# Patient Record
Sex: MALE | Race: WHITE | NOT HISPANIC OR LATINO | Employment: UNEMPLOYED | URBAN - METROPOLITAN AREA
[De-identification: names, ages, dates, MRNs, and addresses within clinical notes are randomized per-mention and may not be internally consistent; named-entity substitution may affect disease eponyms.]

---

## 2024-07-16 ENCOUNTER — HOSPITAL ENCOUNTER (EMERGENCY)
Facility: HOSPITAL | Age: 59
Discharge: HOME | End: 2024-07-16
Attending: EMERGENCY MEDICINE
Payer: COMMERCIAL

## 2024-07-16 VITALS
WEIGHT: 242.95 LBS | HEART RATE: 72 BPM | OXYGEN SATURATION: 100 % | SYSTOLIC BLOOD PRESSURE: 177 MMHG | TEMPERATURE: 97.8 F | RESPIRATION RATE: 18 BRPM | BODY MASS INDEX: 32.06 KG/M2 | DIASTOLIC BLOOD PRESSURE: 85 MMHG

## 2024-07-16 DIAGNOSIS — I10 HYPERTENSION, UNSPECIFIED TYPE: ICD-10-CM

## 2024-07-16 DIAGNOSIS — S61.511A WRIST LACERATION, RIGHT, INITIAL ENCOUNTER: Primary | ICD-10-CM

## 2024-07-16 PROCEDURE — 90715 TDAP VACCINE 7 YRS/> IM: CPT | Performed by: EMERGENCY MEDICINE

## 2024-07-16 PROCEDURE — 2500000005 HC RX 250 GENERAL PHARMACY W/O HCPCS: Performed by: CLINICAL NURSE SPECIALIST

## 2024-07-16 PROCEDURE — 2500000004 HC RX 250 GENERAL PHARMACY W/ HCPCS (ALT 636 FOR OP/ED): Performed by: EMERGENCY MEDICINE

## 2024-07-16 PROCEDURE — 12001 RPR S/N/AX/GEN/TRNK 2.5CM/<: CPT | Performed by: CLINICAL NURSE SPECIALIST

## 2024-07-16 PROCEDURE — 90471 IMMUNIZATION ADMIN: CPT | Performed by: EMERGENCY MEDICINE

## 2024-07-16 PROCEDURE — 99283 EMERGENCY DEPT VISIT LOW MDM: CPT | Mod: 25

## 2024-07-16 RX ORDER — ACETAMINOPHEN 325 MG/1
650 TABLET ORAL ONCE
Status: COMPLETED | OUTPATIENT
Start: 2024-07-16 | End: 2024-07-16

## 2024-07-16 RX ORDER — LIDOCAINE HYDROCHLORIDE 10 MG/ML
5 INJECTION INFILTRATION; PERINEURAL ONCE
Status: COMPLETED | OUTPATIENT
Start: 2024-07-16 | End: 2024-07-16

## 2024-07-16 ASSESSMENT — PAIN - FUNCTIONAL ASSESSMENT: PAIN_FUNCTIONAL_ASSESSMENT: 0-10

## 2024-07-16 ASSESSMENT — COLUMBIA-SUICIDE SEVERITY RATING SCALE - C-SSRS
6. HAVE YOU EVER DONE ANYTHING, STARTED TO DO ANYTHING, OR PREPARED TO DO ANYTHING TO END YOUR LIFE?: NO
1. IN THE PAST MONTH, HAVE YOU WISHED YOU WERE DEAD OR WISHED YOU COULD GO TO SLEEP AND NOT WAKE UP?: NO
2. HAVE YOU ACTUALLY HAD ANY THOUGHTS OF KILLING YOURSELF?: NO

## 2024-07-16 ASSESSMENT — PAIN DESCRIPTION - LOCATION: LOCATION: HAND

## 2024-07-16 ASSESSMENT — PAIN SCALES - GENERAL: PAINLEVEL_OUTOF10: 5 - MODERATE PAIN

## 2024-07-16 ASSESSMENT — PAIN DESCRIPTION - PAIN TYPE: TYPE: ACUTE PAIN

## 2024-07-16 NOTE — PROGRESS NOTES
Attestation/Supervisory note for MAG Martinez      The patient is a 58-year-old male presenting to the emergency department for evaluation of a laceration to the right wrist.  The patient states that he was at work just prior to arrival.  He states he turned around and accidentally stabbed himself with a tool as he did so.  He states that it was bleeding and he was concerned that he may need stitches so he came to the emergency room.  Unknown date of last tetanus.  He is ambidextrous.  He denies any headache or visual changes.  No chest pain or shortness of breath.  No abdominal pain.  No nausea vomiting.  No diarrhea or constipation.  No urinary complaints.  No weakness or numbness.  No fever or chills.  No use of blood thinners.  All pertinent positives and negatives are recorded above.  All other systems reviewed and otherwise negative.  Vital signs with hypertension but otherwise within normal limits.  Physical exam with a well-nourished well-developed male in no acute distress.  HEENT exam within normal limits.  He has no evidence of airway compromise or respiratory distress.  Abdominal exam is benign.  He has no gross motor, neurologic or vascular deficits on exam.  Pulses are equal bilaterally.  He does not have any visible or palpable bony deformities.  No active bleeding at this time.      Wound care provided by nursing staff.  Tetanus was updated.      The laceration was repaired by MAG Martinez without complication      No indication for emergent imaging and/or diagnostic testing at this time      The patient was released in good condition.  He was instructed to follow-up with his primary care physician within 1 to 2 days for repeat check of his blood pressure.  He was also instructed to follow-up with the Worker's Compensation provider within 1 to 2 days for wound check and within 10 to 14 days for suture removal.  He will return to the emergency department sooner with worsening of symptoms or onset of new  symptoms      Impression/diagnosis:  1.  Right wrist laceration  2.  Hypertension, unspecified      I personally saw the patient and made/approve the management plan and take responsibility for the patient management.      I personally discussed the patient's management with the patient        Helen Louie MD

## 2024-07-16 NOTE — DISCHARGE INSTRUCTIONS
Follow-up with your primary care physician within 1 to 2 days for repeat check of your blood pressure.      Follow-up with the [place] of Worker's Compensation provider within 1 to 2 days for a wound check and within 10 to 14 days for suture removal.      Return to the emergency department sooner with worsening of symptoms or onset of new symptoms

## 2024-07-16 NOTE — Clinical Note
Wilfredo Graves was seen and treated in our emergency department on 7/16/2024.  He may return to work on 07/16/2024.  Limited use of the right hand/wrist     If you have any questions or concerns, please don't hesitate to call.      Helen Louie MD

## 2024-07-16 NOTE — ED PROVIDER NOTES
Department of Emergency Medicine   ED  Provider Note  Admit Date/RoomTime: 7/16/2024 10:33 AM  ED Room: ST26/ST26        History of Present Illness:  Chief Complaint   Patient presents with    Laceration     Cut R hand at work on a tool. Last tetanus > 5 yrs ago.          Wilfredo Graves is a 58 y.o. male presents to the emergency department complaints of laceration to his right wrist onset of symptoms about 1010 this morning.  Patient states he went to turn his hand while at work and excellently pulled it into a tool that was sticking out that he was working.  Instantly had pain with bleeding.  Reports he had brisk bleeding was able to compress the bleeding with pressure.  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  Reports chronic tingling sensation in his fingertips with no change.  Denies weakness.  No change in temperature color.  States otherwise in his normal state of health.  Unsure when his last tetanus shot was given.  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    Review of Systems:   Pertinent positives and negatives are stated within HPI, all other systems reviewed and are negative.        --------------------------------------------- PAST HISTORY ---------------------------------------------  Past Medical History:  has a past medical history of Personal history of other diseases of the musculoskeletal system and connective tissue.  Past Surgical History:  has no past surgical history on file.  Social History:    Family History: family history is not on file.. Unless otherwise noted, family history is non contributory  The patient’s home medications have been reviewed.  Allergies: Patient has no known allergies.        ---------------------------------------------------PHYSICAL EXAM--------------------------------------    GENERAL APPEARANCE: Awake and alert.   VITAL SIGNS: As per the  nurses' triage record.  Elevated blood pressure  HEENT: Normocephalic, atraumatic. Extraocular muscles are intact. CHEST: Nontender to palpation. Clear to auscultation bilaterally. No rales, rhonchi, or wheezing.   HEART: S1, S2. Regular rate and rhythm. No murmurs, gallops or rubs.  Strong and equal pulses in the extremities.   ABDOMEN: Soft, nontender, nondistended, positive bowel sounds, no palpable masses.  MUSCULCSKELETAL: Right upper extremity handgrip is strong hand spread is strong.  Cap refill less than 2 sensation intact.  Wiggles fingers no difficulty.  Gaping laceration to the palmar aspect distal base of the palm 1.2 cm gaping will require suture repair.  Appears to be no tendon involvement no visible foreign body.  Bleeding controlled.  Baseline visualized in a bloodless field.  Radial pulse strong and intact cap refill less than 2 sensation intact.  No redness or warmth no drainage.  Moving all extremities per normal per patient.  NEUROLOGICAL: Awake, alert and oriented x 3. Power intact in the upper Sensation is intact to light touch in the upper   IMMUNOLOGICAL: No lymphatic streaking noted   DERM: No petechiae, rashes, or ecchymoses.          ------------------------- NURSING NOTES AND VITALS REVIEWED ---------------------------  The nursing notes within the ED encounter and vital signs as below have been reviewed by myself  /85   Pulse 72   Temp 36.6 °C (97.8 °F) (Oral)   Resp 18   Wt 110 kg (242 lb 15.2 oz)   SpO2 100%   BMI 32.06 kg/m²     Oxygen Saturation Interpretation: 100% room air        The patient’s available past medical records and past encounters were reviewed.          -----------------------DIAGNOSTIC RESULTS------------------------  LABS:    Labs Reviewed - No data to display    As interpreted by me, the above displayed labs are abnormal. All other labs obtained during this visit were within normal range or not returned as of this  dictation.        ------------------------------ ED COURSE/MEDICAL DECISION MAKING----------------------  Medical Decision Making:   Exam: A medically appropriate exam performed, outlined above, given the known history and presentation.    History obtained from: Review of medical record nursing notes patient      Social Determinants of Health considered during this visit: Injury occurred at work   PAST MEDICAL HISTORY/Chronic Conditions Affecting Care     has a past medical history of Personal history of other diseases of the musculoskeletal system and connective tissue.       CC/HPI Summary, Social Determinants of health, Records Reviewed, DDx, testing done/not done, ED Course, Reassessment, disposition considerations/shared decision making with patient, consults, disposition:   Presents emergency department for laceration to his left wrist  Tetanus  Wound care  Laceration repair  Tylenol  Medical Decision Making/Differential Diagnosis:  Based on patient clinical presentation history and symptoms consistent with laceration to the right hand requiring suture repair.  No obvious tendon involvement.  No foreign body base of wound visualized in a bloodless field.  No weakness noted.  No bony abnormality noted.  Please see suture repair note.  Patient remains neurovascularly intact before and after procedure.  Handgrip is strong hand spread is strong.  Cap refill less than 2 sensation 12 radial pulse strong and intact.  Is a patient clinical limitation history and symptoms consistent with laceration to the right hand wrist area.  Requiring 3 simple interrupted sutures neurovascularly intact.  Worker's Compensation claim.  Patient also does have elevated blood pressure meds and follow-up with primary care physician for reevaluation as an appointment on August 1.  Monitor blood pressure daily.  Patient requested Tylenol for pain.  This was ordered  Patient seen and evaluated with attending physician Dr. Casarez    PROCEDURES  Unless otherwise noted below, none  Laceration Repair    Performed by: NERI Henson-CNP  Authorized by: Helen Louie MD    Consent:     Consent obtained:  Verbal    Consent given by:  Patient    Risks, benefits, and alternatives were discussed: yes      Risks discussed:  Pain, vascular damage, tendon damage, poor cosmetic result, poor wound healing, nerve damage, need for additional repair and infection    Alternatives discussed:  Observation  Universal protocol:     Procedure explained and questions answered to patient or proxy's satisfaction: yes      Relevant documents present and verified: yes      Required blood products, implants, devices, and special equipment available: yes      Site/side marked: yes      Immediately prior to procedure, a time out was called: yes      Patient identity confirmed:  Verbally with patient and arm band  Anesthesia:     Anesthesia method:  Local infiltration  Laceration details:     Location:  Shoulder/arm    Shoulder/arm location:  L lower arm    Length (cm):  1.2    Depth (mm):  0.5  Pre-procedure details:     Preparation:  Patient was prepped and draped in usual sterile fashion and imaging obtained to evaluate for foreign bodies  Exploration:     Limited defect created (wound extended): no      Hemostasis achieved with:  Direct pressure    Imaging outcome: foreign body not noted      Wound exploration: wound explored through full range of motion and entire depth of wound visualized    Treatment:     Area cleansed with:  Chlorhexidine    Amount of cleaning:  Extensive    Irrigation solution:  Sterile saline    Irrigation volume:  Copious    Irrigation method:  Syringe    Debridement:  None    Undermining:  None  Skin repair:     Repair method:  Sutures    Suture size:  4-0    Suture material:  Prolene    Suture technique:  Simple interrupted    Number of sutures:  3  Approximation:     Approximation:  Close  Repair type:     Repair type:   Simple  Post-procedure details:     Dressing:  Antibiotic ointment and sterile dressing    Procedure completion:  Tolerated well, no immediate complications  Comments:      Procedure explained to patient.  Neurovascularly intact.  Sensation intact.  Gaping wound to the right palmar aspect hand.  Requiring suture repair baseline visualized in a bloodless field no foreign body noted.  No obvious tendon involvement noted.  Wound was cleansed with chlorhexidine.  Sterilely draped.  Using 1% lidocaine without epinephrine approximately 1 cc was injected around the wound edges to evaluate anesthesia.  Patient tolerated well.  Wound irrigated with copious amounts of normal saline.  Baseline visualized in bloodless field no foreign body noted after irrigation.  Using 4-0 Prolene 3 simple interrupted sutures were used to closely approximate the wound.  Patient tolerated well.  Bacitracin applied.  Dry sterile dressing applied.  Patient vies have sutures removed in 7 to 10 days.  Follow-up with primary care physician and Worker's Compensation team.  Return with any worsening symptoms or concerns advised on signs and symptoms to monitor for at home.  Advised leave current dressing intact until tomorrow change dressing twice a day long-term antibiotic ointment twice a day       CONSULTS:   None      Diagnoses as of 07/16/24 1102   Wrist laceration, right, initial encounter   Hypertension, unspecified type         This patient has remained hemodynamically stable during their ED course.      Critical Care: none       Counseling:  The emergency provider has spoken with the patient and discussed today’s results, in addition to providing specific details for the plan of care and counseling regarding the diagnosis and prognosis.  Questions are answered at this time and they are agreeable with the plan.         --------------------------------- IMPRESSION AND DISPOSITION ---------------------------------    IMPRESSION  1. Wrist  laceration, right, initial encounter    2. Hypertension, unspecified type        DISPOSITION  Disposition: Discharge home Patient condition is stable improved        NOTE: This report was transcribed using voice recognition software. Every effort was made to ensure accuracy; however, inadvertent computerized transcription errors may be present      NERI Henson-MAG  07/16/24 1104

## 2024-11-19 ENCOUNTER — APPOINTMENT (OUTPATIENT)
Dept: OTOLARYNGOLOGY | Facility: CLINIC | Age: 59
End: 2024-11-19
Payer: COMMERCIAL